# Patient Record
Sex: FEMALE | Race: WHITE | URBAN - METROPOLITAN AREA
[De-identification: names, ages, dates, MRNs, and addresses within clinical notes are randomized per-mention and may not be internally consistent; named-entity substitution may affect disease eponyms.]

---

## 2020-12-15 ENCOUNTER — OFFICE VISIT (OUTPATIENT)
Dept: URBAN - METROPOLITAN AREA CLINIC 113 | Facility: CLINIC | Age: 40
End: 2020-12-15
Payer: COMMERCIAL

## 2020-12-15 ENCOUNTER — WEB ENCOUNTER (OUTPATIENT)
Dept: URBAN - METROPOLITAN AREA CLINIC 113 | Facility: CLINIC | Age: 40
End: 2020-12-15

## 2020-12-15 ENCOUNTER — LAB OUTSIDE AN ENCOUNTER (OUTPATIENT)
Dept: URBAN - METROPOLITAN AREA CLINIC 113 | Facility: CLINIC | Age: 40
End: 2020-12-15

## 2020-12-15 ENCOUNTER — DASHBOARD ENCOUNTERS (OUTPATIENT)
Age: 40
End: 2020-12-15

## 2020-12-15 VITALS
HEART RATE: 73 BPM | SYSTOLIC BLOOD PRESSURE: 117 MMHG | HEIGHT: 67 IN | RESPIRATION RATE: 18 BRPM | BODY MASS INDEX: 34.37 KG/M2 | DIASTOLIC BLOOD PRESSURE: 79 MMHG | WEIGHT: 219 LBS | TEMPERATURE: 97.8 F

## 2020-12-15 DIAGNOSIS — R79.89 ELEVATED LFTS: ICD-10-CM

## 2020-12-15 DIAGNOSIS — K21.9 GERD: ICD-10-CM

## 2020-12-15 PROBLEM — 235595009 GASTROESOPHAGEAL REFLUX DISEASE: Status: ACTIVE | Noted: 2020-12-15

## 2020-12-15 PROCEDURE — G8417 CALC BMI ABV UP PARAM F/U: HCPCS | Performed by: INTERNAL MEDICINE

## 2020-12-15 PROCEDURE — 99204 OFFICE O/P NEW MOD 45 MIN: CPT | Performed by: INTERNAL MEDICINE

## 2020-12-15 PROCEDURE — G8483 FLU IMM NO ADMIN DOC REA: HCPCS | Performed by: INTERNAL MEDICINE

## 2020-12-15 PROCEDURE — G8427 DOCREV CUR MEDS BY ELIG CLIN: HCPCS | Performed by: INTERNAL MEDICINE

## 2020-12-15 PROCEDURE — 1036F TOBACCO NON-USER: CPT | Performed by: INTERNAL MEDICINE

## 2020-12-15 RX ORDER — FAMOTIDINE 20 MG/1
1 TABLET AT BEDTIME AS NEEDED TABLET, FILM COATED ORAL ONCE A DAY
Qty: 30 TABLET | Status: ACTIVE | COMMUNITY

## 2020-12-15 RX ORDER — OMEPRAZOLE 20 MG/1
TAKE 1 CAPSULE DAILY CAPSULE, DELAYED RELEASE ORAL ONCE A DAY
Qty: 90 | Refills: 3 | OUTPATIENT
Start: 2020-12-15

## 2020-12-15 NOTE — HPI-TODAY'S VISIT:
Patient presents today for initial evaluation.  She was found recently of elevated LFTs.  She is unaware of any personal history of elevated liver problems.  She denies any history of jaundice or dark urine.  No history of bleeding.  No abdominal pain.  She does have a history of acid reflux.  She has been taking over-the-counter medication every day for last month with some improvement.  She thinks it is Pepcid.  She denies any real swallowing difficulty.  No black or tarry stools.  Not taking regular NSAIDs. She has no history of IV drug use or intranasal cocaine use.  She has a sister with alcohol-related liver disease and another sister with fatty liver disease.  One sister does have lupus.  Previous blood work was obtained by primary care physician revealed an AST of 52 and ALT of 97 with normal alkaline phosphatase and bilirubin.  Hepatitis serologies were negative.  Her CBC was normal.

## 2020-12-16 LAB
ACTIN (SMOOTH MUSCLE) ANTIBODY: 7
ANA DIRECT: NEGATIVE
FERRITIN, SERUM: 196
IRON BIND.CAP.(TIBC): 291
IRON SATURATION: 22
IRON: 65
MITOCHONDRIAL (M2) ANTIBODY: <20
T-TRANSGLUTAMINASE (TTG) IGA: 2
UIBC: 226

## 2021-03-22 ENCOUNTER — OFFICE VISIT (OUTPATIENT)
Dept: URBAN - METROPOLITAN AREA CLINIC 113 | Facility: CLINIC | Age: 41
End: 2021-03-22